# Patient Record
Sex: MALE | HISPANIC OR LATINO | Employment: FULL TIME | URBAN - METROPOLITAN AREA
[De-identification: names, ages, dates, MRNs, and addresses within clinical notes are randomized per-mention and may not be internally consistent; named-entity substitution may affect disease eponyms.]

---

## 2017-10-15 ENCOUNTER — HOSPITAL ENCOUNTER (EMERGENCY)
Facility: MEDICAL CENTER | Age: 22
End: 2017-10-15
Attending: EMERGENCY MEDICINE

## 2017-10-15 VITALS
SYSTOLIC BLOOD PRESSURE: 121 MMHG | WEIGHT: 180 LBS | DIASTOLIC BLOOD PRESSURE: 78 MMHG | HEART RATE: 129 BPM | RESPIRATION RATE: 16 BRPM | BODY MASS INDEX: 27.28 KG/M2 | HEIGHT: 68 IN | TEMPERATURE: 97.3 F

## 2017-10-15 DIAGNOSIS — S01.81XA LACERATION OF FOREHEAD, INITIAL ENCOUNTER: ICD-10-CM

## 2017-10-15 PROCEDURE — 90471 IMMUNIZATION ADMIN: CPT

## 2017-10-15 PROCEDURE — 304999 HCHG REPAIR-SIMPLE/INTERMED LEVEL 1

## 2017-10-15 PROCEDURE — 90715 TDAP VACCINE 7 YRS/> IM: CPT | Performed by: EMERGENCY MEDICINE

## 2017-10-15 PROCEDURE — 700111 HCHG RX REV CODE 636 W/ 250 OVERRIDE (IP): Performed by: EMERGENCY MEDICINE

## 2017-10-15 PROCEDURE — 700101 HCHG RX REV CODE 250: Performed by: EMERGENCY MEDICINE

## 2017-10-15 PROCEDURE — 99283 EMERGENCY DEPT VISIT LOW MDM: CPT

## 2017-10-15 PROCEDURE — 305308 HCHG STAPLER,SKIN,DISP.

## 2017-10-15 RX ORDER — LIDOCAINE HYDROCHLORIDE 20 MG/ML
20 INJECTION, SOLUTION INFILTRATION; PERINEURAL ONCE
Status: COMPLETED | OUTPATIENT
Start: 2017-10-15 | End: 2017-10-15

## 2017-10-15 RX ADMIN — LIDOCAINE HYDROCHLORIDE 20 ML: 20 INJECTION, SOLUTION INFILTRATION; PERINEURAL at 16:45

## 2017-10-15 RX ADMIN — CLOSTRIDIUM TETANI TOXOID ANTIGEN (FORMALDEHYDE INACTIVATED), CORYNEBACTERIUM DIPHTHERIAE TOXOID ANTIGEN (FORMALDEHYDE INACTIVATED), BORDETELLA PERTUSSIS TOXOID ANTIGEN (GLUTARALDEHYDE INACTIVATED), BORDETELLA PERTUSSIS FILAMENTOUS HEMAGGLUTININ ANTIGEN (FORMALDEHYDE INACTIVATED), BORDETELLA PERTUSSIS PERTACTIN ANTIGEN, AND BORDETELLA PERTUSSIS FIMBRIAE 2/3 ANTIGEN 0.5 ML: 5; 2; 2.5; 5; 3; 5 INJECTION, SUSPENSION INTRAMUSCULAR at 16:24

## 2017-10-15 ASSESSMENT — LIFESTYLE VARIABLES: DO YOU DRINK ALCOHOL: NO

## 2017-10-15 ASSESSMENT — PAIN SCALES - GENERAL: PAINLEVEL_OUTOF10: ASSUMED PAIN PRESENT
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
## 2017-10-15 NOTE — DISCHARGE INSTRUCTIONS
Laceration Care, Adult  A laceration is a cut that goes through all layers of the skin. The cut also goes into the tissue that is right under the skin. Some cuts heal on their own. Others need to be closed with stitches (sutures), staples, skin adhesive strips, or wound glue. Taking care of your cut lowers your risk of infection and helps your cut to heal better.  HOW TO TAKE CARE OF YOUR CUT  For stitches or staples:  · Keep the wound clean and dry.  · If you were given a bandage (dressing), you should change it at least one time per day or as told by your doctor. You should also change it if it gets wet or dirty.  · Keep the wound completely dry for the first 24 hours or as told by your doctor. After that time, you may take a shower or a bath. However, make sure that the wound is not soaked in water until after the stitches or staples have been removed.  · Clean the wound one time each day or as told by your doctor:  ¨ Wash the wound with soap and water.  ¨ Rinse the wound with water until all of the soap comes off.  ¨ Pat the wound dry with a clean towel. Do not rub the wound.  · After you clean the wound, put a thin layer of antibiotic ointment on it as told by your doctor. This ointment:  ¨ Helps to prevent infection.  ¨ Keeps the bandage from sticking to the wound.  · Have your stitches or staples removed as told by your doctor.  If your doctor used skin adhesive strips:   · Keep the wound clean and dry.  · If you were given a bandage, you should change it at least one time per day or as told by your doctor. You should also change it if it gets dirty or wet.  · Do not get the skin adhesive strips wet. You can take a shower or a bath, but be careful to keep the wound dry.  · If the wound gets wet, pat it dry with a clean towel. Do not rub the wound.  · Skin adhesive strips fall off on their own. You can trim the strips as the wound heals. Do not remove any strips that are still stuck to the wound. They will  fall off after a while.  If your doctor used wound glue:  · Try to keep your wound dry, but you may briefly wet it in the shower or bath. Do not soak the wound in water, such as by swimming.  · After you take a shower or a bath, gently pat the wound dry with a clean towel. Do not rub the wound.  · Do not do any activities that will make you really sweaty until the skin glue has fallen off on its own.  · Do not apply liquid, cream, or ointment medicine to your wound while the skin glue is still on.  · If you were given a bandage, you should change it at least one time per day or as told by your doctor. You should also change it if it gets dirty or wet.  · If a bandage is placed over the wound, do not let the tape for the bandage touch the skin glue.  · Do not pick at the glue. The skin glue usually stays on for 5-10 days. Then, it falls off of the skin.  General Instructions   · To help prevent scarring, make sure to cover your wound with sunscreen whenever you are outside after stitches are removed, after adhesive strips are removed, or when wound glue stays in place and the wound is healed. Make sure to wear a sunscreen of at least 30 SPF.  · Take over-the-counter and prescription medicines only as told by your doctor.  · If you were given antibiotic medicine or ointment, take or apply it as told by your doctor. Do not stop using the antibiotic even if your wound is getting better.  · Do not scratch or pick at the wound.  · Keep all follow-up visits as told by your doctor. This is important.  · Check your wound every day for signs of infection. Watch for:  ¨ Redness, swelling, or pain.  ¨ Fluid, blood, or pus.  · Raise (elevate) the injured area above the level of your heart while you are sitting or lying down, if possible.  GET HELP IF:  · You got a tetanus shot and you have any of these problems at the injection site:  ¨ Swelling.  ¨ Very bad pain.  ¨ Redness.  ¨ Bleeding.  · You have a fever.  · A wound that was  closed breaks open.  · You notice a bad smell coming from your wound or your bandage.  · You notice something coming out of the wound, such as wood or glass.  · Medicine does not help your pain.  · You have more redness, swelling, or pain at the site of your wound.  · You have fluid, blood, or pus coming from your wound.  · You notice a change in the color of your skin near your wound.  · You need to change the bandage often because fluid, blood, or pus is coming from the wound.  · You start to have a new rash.  · You start to have numbness around the wound.  GET HELP RIGHT AWAY IF:  · You have very bad swelling around the wound.  · Your pain suddenly gets worse and is very bad.  · You notice painful lumps near the wound or on skin that is anywhere on your body.  · You have a red streak going away from your wound.  · The wound is on your hand or foot and you cannot move a finger or toe like you usually can.  · The wound is on your hand or foot and you notice that your fingers or toes look pale or bluish.     This information is not intended to replace advice given to you by your health care provider. Make sure you discuss any questions you have with your health care provider.     Document Released: 06/05/2009 Document Revised: 05/03/2016 Document Reviewed: 12/14/2015  [employer] Interactive Patient Education ©2016 [employer]      Head Injury, Adult  You have a head injury. Headaches and throwing up (vomiting) are common after a head injury. It should be easy to wake up from sleeping. Sometimes you must stay in the hospital. Most problems happen within the first 24 hours. Side effects may occur up to 7-10 days after the injury.   WHAT ARE THE TYPES OF HEAD INJURIES?  Head injuries can be as minor as a bump. Some head injuries can be more severe. More severe head injuries include:  · A jarring injury to the brain (concussion).  · A bruise of the brain (contusion). This mean there is bleeding in the brain that can  cause swelling.  · A cracked skull (skull fracture).  · Bleeding in the brain that collects, clots, and forms a bump (hematoma).  WHEN SHOULD I GET HELP RIGHT AWAY?   · You are confused or sleepy.  · You cannot be woken up.  · You feel sick to your stomach (nauseous) or keep throwing up (vomiting).  · Your dizziness or unsteadiness is getting worse.  · You have very bad, lasting headaches that are not helped by medicine. Take medicines only as told by your doctor.  · You cannot use your arms or legs like normal.  · You cannot walk.  · You notice changes in the black spots in the center of the colored part of your eye (pupil).  · You have clear or bloody fluid coming from your nose or ears.  · You have trouble seeing.  During the next 24 hours after the injury, you must stay with someone who can watch you. This person should get help right away (call 911 in the [place] if you start to shake and are not able to control it (have seizures), you pass out, or you are unable to wake up.  HOW CAN I PREVENT A HEAD INJURY IN THE FUTURE?  · Wear seat belts.  · Wear a helmet while bike riding and playing sports like football.  · Stay away from dangerous activities around the house.  WHEN CAN I RETURN TO NORMAL ACTIVITIES AND ATHLETICS?  See your doctor before doing these activities. You should not do normal activities or play contact sports until 1 week after the following symptoms have stopped:  · Headache that does not go away.  · Dizziness.  · Poor attention.  · Confusion.  · Memory problems.  · Sickness to your stomach or throwing up.  · Tiredness.  · Fussiness.  · Bothered by bright lights or loud noises.  · Anxiousness or depression.  · Restless sleep.  MAKE SURE YOU:   · Understand these instructions.  · Will watch your condition.  · Will get help right away if you are not doing well or get worse.     This information is not intended to replace advice given to you by your health care provider. Make sure you discuss any  questions you have with your health care provider.     Document Released: 11/30/2009 Document Revised: 01/08/2016 Document Reviewed: 08/25/2014  [employer] Interactive Patient Education ©2016 [employer]
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
## 2017-10-15 NOTE — ED NOTES
Discharge instructions given to patient and [employer], pt continues to be verbally aggressiv.  Pt preferred to walk out accompanied by [employer] VSS, all belongings accounted for.

## 2019-06-18 ENCOUNTER — HOSPITAL ENCOUNTER (EMERGENCY)
Facility: MEDICAL CENTER | Age: 24
End: 2019-06-18
Attending: EMERGENCY MEDICINE

## 2019-06-18 VITALS
SYSTOLIC BLOOD PRESSURE: 128 MMHG | DIASTOLIC BLOOD PRESSURE: 80 MMHG | TEMPERATURE: 97.7 F | OXYGEN SATURATION: 96 % | HEIGHT: 64 IN | WEIGHT: 194.67 LBS | HEART RATE: 90 BPM | BODY MASS INDEX: 33.23 KG/M2 | RESPIRATION RATE: 18 BRPM

## 2019-06-18 DIAGNOSIS — K02.9 INFECTED DENTAL CARIES: ICD-10-CM

## 2019-06-18 DIAGNOSIS — K04.7 DENTAL ABSCESS: ICD-10-CM

## 2019-06-18 DIAGNOSIS — K04.7 INFECTED DENTAL CARIES: ICD-10-CM

## 2019-06-18 PROCEDURE — 96372 THER/PROPH/DIAG INJ SC/IM: CPT

## 2019-06-18 PROCEDURE — 700102 HCHG RX REV CODE 250 W/ 637 OVERRIDE(OP): Performed by: EMERGENCY MEDICINE

## 2019-06-18 PROCEDURE — A9270 NON-COVERED ITEM OR SERVICE: HCPCS | Performed by: EMERGENCY MEDICINE

## 2019-06-18 PROCEDURE — 700111 HCHG RX REV CODE 636 W/ 250 OVERRIDE (IP): Performed by: EMERGENCY MEDICINE

## 2019-06-18 PROCEDURE — 99284 EMERGENCY DEPT VISIT MOD MDM: CPT

## 2019-06-18 RX ORDER — AMOXICILLIN AND CLAVULANATE POTASSIUM 875; 125 MG/1; MG/1
1 TABLET, FILM COATED ORAL ONCE
Status: COMPLETED | OUTPATIENT
Start: 2019-06-18 | End: 2019-06-18

## 2019-06-18 RX ORDER — HYDROCODONE BITARTRATE AND ACETAMINOPHEN 5; 325 MG/1; MG/1
1 TABLET ORAL ONCE
Status: COMPLETED | OUTPATIENT
Start: 2019-06-18 | End: 2019-06-18

## 2019-06-18 RX ORDER — KETOROLAC TROMETHAMINE 30 MG/ML
30 INJECTION, SOLUTION INTRAMUSCULAR; INTRAVENOUS ONCE
Status: COMPLETED | OUTPATIENT
Start: 2019-06-18 | End: 2019-06-18

## 2019-06-18 RX ORDER — AMOXICILLIN AND CLAVULANATE POTASSIUM 875; 125 MG/1; MG/1
1 TABLET, FILM COATED ORAL 2 TIMES DAILY
Qty: 14 TAB | Refills: 0 | Status: SHIPPED | OUTPATIENT
Start: 2019-06-18 | End: 2019-06-25

## 2019-06-18 RX ADMIN — KETOROLAC TROMETHAMINE 30 MG: 30 INJECTION, SOLUTION INTRAMUSCULAR at 08:06

## 2019-06-18 RX ADMIN — AMOXICILLIN AND CLAVULANATE POTASSIUM 1 TABLET: 875; 125 TABLET, FILM COATED ORAL at 08:06

## 2019-06-18 RX ADMIN — BENZOCAINE, BUTAMBEN, AND TETRACAINE HYDROCHLORIDE 1 SPRAY: .028; .004; .004 AEROSOL, SPRAY TOPICAL at 08:00

## 2019-06-18 RX ADMIN — HYDROCODONE BITARTRATE AND ACETAMINOPHEN 1 TABLET: 5; 325 TABLET ORAL at 08:22

## 2019-06-18 NOTE — ED TRIAGE NOTES
"Reji Quiros  Chief Complaint   Patient presents with   • Dental Pain     Pt complains of dental pain for appx 3 weeks. Pt was seen at [facility] appx 3 weeks ago diagnosed with a lower right tooth abscess and was given Penicillin. Pt reports relief of symptoms but it is back again. Pt has noticable swelling to Right lower jaw.      Pt ambulatory to triage with above complaint.     /79   Pulse 97   Temp 36.1 °C (97 °F) (Temporal)   Resp 18   Ht 1.626 m (5' 4\")   Wt 88.3 kg (194 lb 10.7 oz)   SpO2 96%   BMI 33.41 kg/m²     Pt informed of triage process and encouraged to notify staff of any changes or concerns. Pt verbalized understanding of instructions. Pt placed back in lobby.     "

## 2019-06-18 NOTE — ED PROVIDER NOTES
"ED Provider Note    CHIEF COMPLAINT  Chief Complaint   Patient presents with   • Dental Pain     Pt complains of dental pain for appx 3 weeks. Pt was seen at [facility] app 3 weeks ago diagnosed with a lower right tooth abscess and was given Penicillin. Pt reports relief of symptoms but it is back again. Pt has noticable swelling to Right lower jaw.        [facility]  Reji Quiros is a 24 y.o. male who presents to the emergency department through triage for dental pain.  Patient describes dental pain for 2 to 3 days, now with right facial swelling as well.  Patient states he initially had dental pain 3 weeks ago, was seen at outside hospital, given \"penicillin\" which made symptoms much better however pain has returned.  No difficulty swallowing or breathing.  No neck pain or stiffness.  No fever chills.  No nausea or vomiting.    Right lower dental pain worse with mastication and cold fluids.    REVIEW OF SYSTEMS  See HPI for further details. All other systems are negative.     PAST MEDICAL HISTORY   Denies    SOCIAL HISTORY  Social History     Social History Main Topics   • Smoking status: Current Every Day Smoker     Packs/day: 0.25     Years: 10.00     Types: Cigarettes   • Smokeless tobacco: Never Used      Comment: 1 cigarette/day   • Alcohol use No      Comment: SOCIAL   • Drug use: No      Comment: marijuana and cocaine   • Sexual activity: Not on file       SURGICAL HISTORY   has a past surgical history that includes irrigation & debridement general (6/15/2013); laceration repair (6/15/2013); external fixator application (4/18/2015); irrigation & debridement ortho (4/18/2015); fibula orif (4/21/2015); and tibia orif (4/21/2015).    CURRENT MEDICATIONS  Home Medications    **Home medications have not yet been reviewed for this encounter**         ALLERGIES  No Known Allergies    PHYSICAL EXAM  VITAL SIGNS: /79   Pulse 97   Temp 36.1 °C (97 °F) (Temporal)   Resp 18   Ht 1.626 m (5' 4\") "   Wt 88.3 kg (194 lb 10.7 oz)   SpO2 96%   BMI 33.41 kg/m²   Pulse ox interpretation: I interpret this pulse ox as normal.  Constitutional: Alert in no apparent distress.  HENT: Normocephalic, atraumatic. Bilateral external ears normal, TMs clear bilaterally.  No mastoiditis.  Nose normal. Moist mucous membranes.  Right facial swelling, overlying the mandible.  No overlying cellulitis.  No induration.  Broken right rear molar, tender to percussion.  Gingival swelling and some fluctuance in the adjacent region.  No lingual elevation.  Uvula midline.  Tolerating secretions.  No stridor dysphonia.  Eyes: Conjunctiva normal.   Neck: Normal range of motion, Supple.  No meningeal irritation.  Lymphatic: No lymphadenopathy noted.  No cervical some mandibular lymphadenopathy.  Cardiovascular: Normal peripheral perfusion   Thorax & Lungs: Nonlabored respirations.  Skin: Warm, Dry  Musculoskeletal: Good range of motion in all major joints.   Neurologic: Alert and oriented x4.  Ambulating independently.  Psychiatric: Affect normal, Judgment normal, Mood normal.       DIAGNOSTIC STUDIES / PROCEDURES    INCISION AND DRAINAGE PROCEDURE NOTE:  Patient identification was confirmed and consent was obtained.  This procedure was performed by Dr. Somers  Site: Gingiva adjacent to right low molars  Sterile procedures observed  Anesthetic used (type and amt): Hurricaine spray  Blade size: 20-gauge needle aspiration  Drainage: 1.5 cc purulent although upon removal of the needle, patient continued to have purulent drainage, expressed until .  Site anesthetized, needle aspiration over site, wound drained, rinsed with copious amounts of normal saline, dried, packed with gauze of the removed before discharge with no persistent bleeding or drainage. Pt tolerated procedure well without complications. Instructions for care discussed verbally and pt provided with additional written instructions for homecare and f/u.    COURSE & MEDICAL  DECISION MAKING  Nursing notes and vital signs were reviewed. (See chart for details)  The patients records were reviewed, history was obtained from the patient;     ED evaluation demonstrates dental abscess, infected dental caries.  No facial cellulitis.  No airway involvement.  No meningitis.  Gingival fluctuance aspirated as described above with purulent drainage.  Swelling, discomfort much that are thereafter.  First dose of Augmentin as well as Toradol and Norco given in the emergency department.  Otherwise stable, no fever or tachycardia, never hypotensive.  Tolerating oral fluids and medications.    Patient is stable for discharge at this time, anticipatory guidance provided, Augmentin for 7 days, close follow-up is encouraged with a dentist as soon as possible for reevaluation and likely extraction, and strict ED return instructions have been detailed. Patient is agreeable to the disposition and plan.    Patient's blood pressure was elevated in the emergency department, and has been referred to primary care for close monitoring.      FINAL IMPRESSION  (K04.7) Dental abscess  (K02.9,  K04.7) Infected dental caries      Electronically signed by: Darya Somers, 6/18/2019 8:11 AM      This dictation was created using voice recognition software. The accuracy of the dictation is limited to the abilities of the software. I expect there may be some errors of grammar and possibly content. The nursing notes were reviewed and certain aspects of this information were incorporated into this note.

## 2019-06-18 NOTE — ED NOTES
Pt presents to ER for dental pain. PT was treated at an OSH for his dental abscess with abx. PT finished his treatment and states this morning he woke up and swelling on the right side of his jaw.  PT denies CP, SOB, changes in bowl/bladder, dizziness. Pt is able to swallow. No drooling noted.   PT AOx4. PT in no distress at this time. Call bell within reach, Bed rails up. All needs addressed. Will continue to monitor.

## 2019-06-18 NOTE — DISCHARGE INSTRUCTIONS
Follow-up with a dentist as soon as possible for reevaluation and definitive treatment, suspect dental extraction.  Follow-up with primary care as well to establish care, for medication management close blood pressure monitoring.    Augmentin twice daily for 7 days.  Tylenol or ibuprofen as needed for swelling or discomfort.    Soft, room temperature diet as tolerated.    Return to the emergency department for persistent or worsening dental pain, facial or oral swelling, difficulty swallowing or breathing, neck pain or stiffness, fever, vomiting or other new concerns.

## 2019-07-08 ENCOUNTER — HOSPITAL ENCOUNTER (EMERGENCY)
Facility: MEDICAL CENTER | Age: 24
End: 2019-07-08
Attending: EMERGENCY MEDICINE

## 2019-07-08 VITALS
HEIGHT: 68 IN | OXYGEN SATURATION: 95 % | SYSTOLIC BLOOD PRESSURE: 125 MMHG | DIASTOLIC BLOOD PRESSURE: 72 MMHG | RESPIRATION RATE: 16 BRPM | TEMPERATURE: 97.5 F | BODY MASS INDEX: 29.6 KG/M2 | HEART RATE: 79 BPM

## 2019-07-08 DIAGNOSIS — K02.9 PAIN DUE TO DENTAL CARIES: ICD-10-CM

## 2019-07-08 PROCEDURE — 99283 EMERGENCY DEPT VISIT LOW MDM: CPT

## 2019-07-08 RX ORDER — AMOXICILLIN AND CLAVULANATE POTASSIUM 875; 125 MG/1; MG/1
1 TABLET, FILM COATED ORAL 2 TIMES DAILY
Qty: 20 TAB | Refills: 0 | Status: SHIPPED | OUTPATIENT
Start: 2019-07-08 | End: 2019-07-18

## 2019-07-08 RX ORDER — IBUPROFEN 600 MG/1
600 TABLET ORAL EVERY 6 HOURS PRN
Qty: 30 TAB | Refills: 0 | Status: SHIPPED | OUTPATIENT
Start: 2019-07-08

## 2019-07-08 NOTE — ED TRIAGE NOTES
Pt with dental pain and mild swelling to right bottom. Pt has appt at dentist on Thursday for tooth removal.

## 2019-07-08 NOTE — DISCHARGE INSTRUCTIONS
Take all your antibiotics until gone. Return if you have face swelling, difficulty breathing, difficulty swallowing or fever.

## 2019-07-08 NOTE — ED PROVIDER NOTES
ED Provider Note    Scribed for Joey Ramachandran M.D. by Julio Storey. 7/8/2019, 3:15 PM.    Primary care provider: Pcp Pt States None  Means of arrival: Walk-in  History obtained from: Patient  History limited by: None    CHIEF COMPLAINT  Chief Complaint   Patient presents with   • Dental Pain       HPI  Reji Ott is a 24 y.o. male who presents to the Emergency Department complaining right upper dental pain and swelling. He was seen here for dental pain and swelling in a similar region on 6/18/2019 and underwent incision and drainage of a dental abscess. He was prescribed Augmentin, which he finished about a week and a half ago, but the pain and swelling have persisted. He is scheduled to see a dentist in three days. He denies dysphagia, fever, or chills.    REVIEW OF SYSTEMS  Pertinent positives include dental pain and swelling. Pertinent negatives include dysphagia, fever, or chills.    PAST MEDICAL HISTORY   No diabetes mellitus     SURGICAL HISTORY   has a past surgical history that includes irrigation & debridement general (6/15/2013); laceration repair (6/15/2013); external fixator application (4/18/2015); irrigation & debridement ortho (4/18/2015); fibula orif (4/21/2015); and tibia orif (4/21/2015).    SOCIAL HISTORY  Social History   Substance Use Topics   • Smoking status: Current Every Day Smoker     Packs/day: 0.25     Years: 10.00     Types: Cigarettes   • Smokeless tobacco: Never Used      Comment: 1 cigarette/day   • Alcohol use No      Comment: SOCIAL      History   Drug Use No     Comment: marijuana and cocaine     CURRENT MEDICATIONS  No current facility-administered medications on file prior to encounter.      Current Outpatient Prescriptions on File Prior to Encounter   Medication Sig Dispense Refill   • oxycodone immediate-release (OXY-IR) 15 MG immediate release tablet Take 1 Tab by mouth every 3 hours as needed for Severe Pain ((Pain Scale 7-10)). 80 Tab 0   •  "vitamin D, Ergocalciferol, (DRISDOL) 15593 UNITS CAPS capsule Take 1 Cap by mouth every 3 days. 30 Cap 0   • aspirin (ASA) 325 MG TABS Take 1 Tab by mouth every day. 30 Tab 0       ALLERGIES  No Known Allergies    PHYSICAL EXAM  VITAL SIGNS: /70   Pulse 92   Temp 36.7 °C (98 °F) (Temporal)   Resp 18   Ht 1.727 m (5' 8\")   SpO2 95%   BMI 29.60 kg/m²     Pulse ox interpretation: I interpret this pulse ox as normal.  Constitutional: Alert in no apparent distress.  HENT: Normocephalic, Atraumatic, Bilateral external ears normal. Nose normal. Dental caries down to the gumline of tooth 31. Mild gum swelling. No fluctuance, no obvious abscess, no signs of rai's angina  Neck: No cervical lymphadenopathy.       COURSE & MEDICAL DECISION MAKING  Nursing notes, VS, PMSFHx reviewed in chart.    Review of past medical records shows the patient was seen here in June for dental abscess and underwent aspiration.    3:15 PM - Patient seen and examined at bedside. I discussed his above findings and plans for discharge with a prescription for Augmentin and Motrin.  He is advised to follow up with his dentist as scheduled if he cannot get into his dentist was given information about [facility] dental extraction clinic.. Patient is referred to [facility] to establish a primary care. He is instructed to return to the ED if his symptoms worsen or for facial swelling, swelling under the tongue, or dysphagia. Patient understands and agrees.    Medical Decision Making: At this point time patient appears to have an infected dental carry.  I do not feel any palpable abscess that would be drainable.  Patient will be placed back on antibiotics and given ibuprofen for pain.  There is no signs of Rai's angina.  I will have him follow-up with his dentist as scheduled.    The patient will return for new or worsening symptoms and is stable at the time of discharge.    DISPOSITION:  Patient will be discharged home in stable " condition.    FOLLOW UP:  Your dentist    Schedule an appointment as soon as possible for a visit in 3 days  as scheduled.    [facility]  [phone]    You can ask about the extraction clinic.    [address] [address] [phone]    If you need a doctor or dentist      OUTPATIENT MEDICATIONS:  New Prescriptions    AMOXICILLIN-CLAVULANATE (AUGMENTIN) 875-125 MG TAB    Take 1 Tab by mouth 2 times a day for 10 days.    IBUPROFEN (MOTRIN) 600 MG TAB    Take 1 Tab by mouth every 6 hours as needed.         FINAL IMPRESSION  1. Pain due to dental caries          Julio SWANN (Scribe), am scribing for, and in the presence of, Joey Ramahcandran M.D.    Electronically signed by: Julio Storey (Scribe), 7/8/2019    IJoey M.D. personally performed the services described in this documentation, as scribed by Julio Storey in my presence, and it is both accurate and complete.    E    The note accurately reflects work and decisions made by me.  Joey Ramachandran  7/8/2019  3:44 PM

## 2019-07-08 NOTE — ED NOTES
All lines and monitors discontinued. Discharge instructions given, questions answered.    Ambulated out of ER, escorted by RN.  Instructed not to drive after taking pain medication and pt verbalizes understanding.  Rx x 2 given.

## 2024-12-31 ENCOUNTER — APPOINTMENT (OUTPATIENT)
Dept: URGENT CARE | Facility: CLINIC | Age: 29
End: 2024-12-31